# Patient Record
Sex: FEMALE | Race: WHITE | NOT HISPANIC OR LATINO | Employment: FULL TIME | URBAN - NONMETROPOLITAN AREA
[De-identification: names, ages, dates, MRNs, and addresses within clinical notes are randomized per-mention and may not be internally consistent; named-entity substitution may affect disease eponyms.]

---

## 2024-07-04 ENCOUNTER — OFFICE VISIT (OUTPATIENT)
Dept: URGENT CARE | Facility: CLINIC | Age: 54
End: 2024-07-04
Payer: COMMERCIAL

## 2024-07-04 VITALS
DIASTOLIC BLOOD PRESSURE: 70 MMHG | TEMPERATURE: 97.9 F | SYSTOLIC BLOOD PRESSURE: 110 MMHG | OXYGEN SATURATION: 100 % | HEART RATE: 59 BPM | RESPIRATION RATE: 18 BRPM

## 2024-07-04 DIAGNOSIS — H01.113 ALLERGIC DERMATITIS OF EYELIDS OF BOTH EYES, UNSPECIFIED EYELID: Primary | ICD-10-CM

## 2024-07-04 DIAGNOSIS — H01.116 ALLERGIC DERMATITIS OF EYELIDS OF BOTH EYES, UNSPECIFIED EYELID: Primary | ICD-10-CM

## 2024-07-04 PROCEDURE — 99213 OFFICE O/P EST LOW 20 MIN: CPT | Performed by: PHYSICIAN ASSISTANT

## 2024-07-04 RX ORDER — PREDNISONE 10 MG/1
TABLET ORAL
Qty: 20 TABLET | Refills: 0 | Status: SHIPPED | OUTPATIENT
Start: 2024-07-04

## 2024-07-04 RX ORDER — OLOPATADINE HYDROCHLORIDE 1 MG/ML
1 SOLUTION/ DROPS OPHTHALMIC 2 TIMES DAILY
Qty: 5 ML | Refills: 0 | Status: SHIPPED | OUTPATIENT
Start: 2024-07-04

## 2024-07-04 NOTE — PROGRESS NOTES
[facility]        NAME: Letty Sánchez is a 53 y.o. female  : 1970    MRN: [id number]  DATE: 2024  TIME: 10:41 AM    Assessment and Plan   Allergic dermatitis of eyelids of both eyes, unspecified eyelid [H01.113, H01.116]  1. Allergic dermatitis of eyelids of both eyes, unspecified eyelid  predniSONE 10 mg tablet    olopatadine (PATANOL) 0.1 % ophthalmic solution            Patient Instructions   There are no Patient Instructions on file for this visit.      Follow up with PCP in 3-5 days.  Proceed to  ER if symptoms worsen.    Chief Complaint     Chief Complaint   Patient presents with    Allergic Reaction     Pt c/o allergies acting up, started 5 weeks ago, puffy eyes. Pt doesn't know if its seasonal or food.         History of Present Illness       Patient presents the clinic for swelling of both eyes for the past week.  She states that she also has itching of her eyes.  She was initially treating her symptoms with Benadryl and over-the-counter eyedrops.  Her symptoms did seem to resolve but returned over the last few days.  She denies associated sneezing, URI symptoms, swelling of her lips, swelling of her tongue, fevers, or chills.        Review of Systems   Review of Systems   Constitutional:  Negative for chills and fever.   HENT:  Negative for congestion, ear pain, hearing loss, mouth sores, nosebleeds, postnasal drip, rhinorrhea, sinus pressure, sinus pain, sneezing, sore throat, trouble swallowing and voice change.    Eyes:  Positive for itching. Negative for photophobia, pain, redness and visual disturbance.   Respiratory:  Negative for cough and shortness of breath.    Cardiovascular:  Negative for chest pain and palpitations.   Gastrointestinal:  Negative for abdominal pain, anal bleeding, constipation, diarrhea, nausea, rectal pain and vomiting.   Genitourinary:  Negative for dysuria and hematuria.   Musculoskeletal:  Negative for arthralgias and back pain.   Skin:  Negative  for color change and rash.   Neurological:  Negative for seizures and syncope.   All other systems reviewed and are negative.        Current Medications       Current Outpatient Medications:     olopatadine (PATANOL) 0.1 % ophthalmic solution, Administer 1 drop to both eyes 2 (two) times a day, Disp: 5 mL, Rfl: 0    predniSONE 10 mg tablet, 4 po for 2 days, then 3x2 2x2 1x2, Disp: 20 tablet, Rfl: 0    Current Allergies     Allergies as of 07/04/2024    (No Known Allergies)            The following portions of the patient's history were reviewed and updated as appropriate: allergies, current medications, past family history, past medical history, past social history, past surgical history and problem list.     History reviewed. No pertinent past medical history.    History reviewed. No pertinent surgical history.    History reviewed. No pertinent family history.      Medications have been verified.        Objective   /70   Pulse 59   Temp 97.9 °F (36.6 °C) (Temporal)   Resp 18   SpO2 100%        Physical Exam     Physical Exam  Eyes:      General: Allergic shiner present.         Right eye: No foreign body.         Left eye: No foreign body.      Extraocular Movements:      Right eye: Normal extraocular motion and no nystagmus.      Left eye: Normal extraocular motion and no nystagmus.      Conjunctiva/sclera:      Right eye: Right conjunctiva is injected. No chemosis, exudate or hemorrhage.     Left eye: Left conjunctiva is injected. No chemosis, exudate or hemorrhage.     Pupils:      Right eye: Pupil is not sluggish.      Left eye: Pupil is not sluggish.        Comments: -Patient has edema noted bilateral upper eyelid.           -The patient's symptoms are most consistent with allergic blepharitis.  I will add steroids and antihistamine eyedrop.  Suggest an eye doctor if symptoms do not improve

## 2025-02-22 ENCOUNTER — OFFICE VISIT (OUTPATIENT)
Dept: URGENT CARE | Facility: CLINIC | Age: 55
End: 2025-02-22
Payer: COMMERCIAL

## 2025-02-22 VITALS
SYSTOLIC BLOOD PRESSURE: 127 MMHG | HEART RATE: 78 BPM | RESPIRATION RATE: 18 BRPM | OXYGEN SATURATION: 98 % | DIASTOLIC BLOOD PRESSURE: 83 MMHG | TEMPERATURE: 98.4 F

## 2025-02-22 DIAGNOSIS — J02.9 VIRAL PHARYNGITIS: Primary | ICD-10-CM

## 2025-02-22 DIAGNOSIS — R05.1 ACUTE COUGH: ICD-10-CM

## 2025-02-22 PROBLEM — Z13.220 SCREENING FOR LIPID DISORDERS: Status: RESOLVED | Noted: 2020-02-03 | Resolved: 2025-02-22

## 2025-02-22 PROBLEM — R53.83 FATIGUE: Status: ACTIVE | Noted: 2020-02-03

## 2025-02-22 PROBLEM — Z12.11 COLON CANCER SCREENING: Status: RESOLVED | Noted: 2019-05-20 | Resolved: 2025-02-22

## 2025-02-22 PROBLEM — E78.5 HYPERLIPIDEMIA: Status: ACTIVE | Noted: 2021-04-04

## 2025-02-22 PROBLEM — F41.9 ANXIETY: Status: ACTIVE | Noted: 2021-05-11

## 2025-02-22 PROBLEM — D50.0 IRON DEFICIENCY ANEMIA DUE TO CHRONIC BLOOD LOSS: Status: ACTIVE | Noted: 2022-06-10

## 2025-02-22 PROBLEM — Z15.89 HETEROZYGOUS MTHFR MUTATION A1298C: Status: ACTIVE | Noted: 2022-06-10

## 2025-02-22 PROBLEM — Z86.39 H/O THYROID NODULE: Status: ACTIVE | Noted: 2020-02-03

## 2025-02-22 LAB — S PYO AG THROAT QL: NEGATIVE

## 2025-02-22 PROCEDURE — 99213 OFFICE O/P EST LOW 20 MIN: CPT

## 2025-02-22 PROCEDURE — 87880 STREP A ASSAY W/OPTIC: CPT

## 2025-02-22 RX ORDER — DEXTROMETHORPHAN HYDROBROMIDE AND PROMETHAZINE HYDROCHLORIDE 15; 6.25 MG/5ML; MG/5ML
5 SYRUP ORAL 4 TIMES DAILY PRN
Qty: 120 ML | Refills: 0 | Status: SHIPPED | OUTPATIENT
Start: 2025-02-22

## 2025-02-22 RX ORDER — METHYLPREDNISOLONE 4 MG/1
TABLET ORAL
Qty: 1 EACH | Refills: 0 | Status: SHIPPED | OUTPATIENT
Start: 2025-02-22

## 2025-02-22 NOTE — PROGRESS NOTES
[facility]        NAME: Letty Sánchez is a 54 y.o. female  : 1970    MRN: [id number]  DATE: 2025  TIME: 1:33 PM    Assessment and Plan   Viral pharyngitis [J02.9]  1. Viral pharyngitis  POCT rapid ANTIGEN strepA      2. Acute cough          POCT Strep: negative     Discussed low likelihood of this patient's symptoms being viral as she began with a sore throat about 1 week ago and her rapid strep is negative.  She is more concerned with the cough she developed in the last few days.  Advised patient her lungs are clear to auscultation bilaterally, she has been experiencing no fever, appetite disturbance, or activity level disturbance.  She is not experiencing any flulike symptoms.  Advised patient to continue using over-the-counter medications to alleviate her symptoms as well as discussed home remedies to assist.  Offered patient Medrol Dosepak and cough medication to which she is on sure if she will obtain at this time.  Advised on dosing and scheduling if she decides to obtain the medication.  Otherwise, she will take over-the-counter medication and follow-up with her family doctor if not improving in approximately 7 days.    Patient Instructions       Follow up with PCP in 3-5 days.  Proceed to  ER if symptoms worsen.    If tests are performed, our office will contact you with results only if changes need to made to the care plan discussed with you at the visit. [facility]    Chief Complaint     Chief Complaint   Patient presents with    Sore Throat     With cough onset 1 week ago tried otc meds without relief          History of Present Illness       54-year-old female with past medical history significant for anxiety, fatigue, thyroid nodules, hyperlipidemia, iron deficiency anemia presents to this clinic with complaint of sore throat with cough.  Symptom onset 1 week ago.  Trialed over-the-counter medications without relief.    Sore  Throat   Associated symptoms include coughing.       Review of Systems   Review of Systems   HENT:  Positive for sore throat.    Respiratory:  Positive for cough.          Current Medications       Current Outpatient Medications:     olopatadine (PATANOL) 0.1 % ophthalmic solution, Administer 1 drop to both eyes 2 (two) times a day (Patient not taking: Reported on 2/22/2025), Disp: 5 mL, Rfl: 0    predniSONE 10 mg tablet, 4 po for 2 days, then 3x2 2x2 1x2 (Patient not taking: Reported on 2/22/2025), Disp: 20 tablet, Rfl: 0    Current Allergies     Allergies as of 02/22/2025 - Reviewed 02/22/2025   Allergen Reaction Noted    Penicillins Rash 10/14/2013            The following portions of the patient's history were reviewed and updated as appropriate: allergies, current medications, past family history, past medical history, past social history, past surgical history and problem list.     Past Medical History:   Diagnosis Date    Colon cancer screening 05/20/2019    Screening for lipid disorders 02/03/2020       Past Surgical History:   Procedure Laterality Date    CARPAL TUNNEL RELEASE         History reviewed. No pertinent family history.      Medications have been verified.        Objective   /83   Pulse 78   Temp 98.4 °F (36.9 °C)   Resp 18   SpO2 98%        Physical Exam     Physical Exam  Vitals and nursing note reviewed.   Constitutional:       Appearance: Normal appearance. She is normal weight.   HENT:      Head: Normocephalic and atraumatic.      Right Ear: Tympanic membrane, ear canal and external ear normal.      Left Ear: Tympanic membrane, ear canal and external ear normal.      Nose: Nose normal.      Mouth/Throat:      Mouth: Mucous membranes are moist.      Pharynx: Posterior oropharyngeal erythema present.   Eyes:      Extraocular Movements: Extraocular movements intact.      Conjunctiva/sclera: Conjunctivae normal.      Pupils: Pupils are equal, round, and reactive to light.    Cardiovascular:      Rate and Rhythm: Normal rate and regular rhythm.      Pulses: Normal pulses.      Heart sounds: Normal heart sounds.   Pulmonary:      Effort: Pulmonary effort is normal.      Breath sounds: Normal breath sounds.      Comments: Occasional dry cough  Musculoskeletal:         General: Normal range of motion.      Cervical back: Normal range of motion and neck supple.   Lymphadenopathy:      Cervical: No cervical adenopathy.   Skin:     General: Skin is warm and dry.      Capillary Refill: Capillary refill takes less than 2 seconds.   Neurological:      General: No focal deficit present.      Mental Status: She is alert and oriented to person, place, and time.